# Patient Record
Sex: FEMALE | Race: OTHER
[De-identification: names, ages, dates, MRNs, and addresses within clinical notes are randomized per-mention and may not be internally consistent; named-entity substitution may affect disease eponyms.]

---

## 2017-04-21 ENCOUNTER — HOSPITAL ENCOUNTER (EMERGENCY)
Dept: HOSPITAL 56 - MW.ED | Age: 47
Discharge: HOME | End: 2017-04-21
Payer: MEDICAID

## 2017-04-21 VITALS — DIASTOLIC BLOOD PRESSURE: 80 MMHG | SYSTOLIC BLOOD PRESSURE: 150 MMHG

## 2017-04-21 DIAGNOSIS — L40.8: Primary | ICD-10-CM

## 2017-04-21 DIAGNOSIS — M79.1: ICD-10-CM

## 2017-04-21 LAB
CHLORIDE SERPL-SCNC: 111 MMOL/L (ref 98–110)
SODIUM SERPL-SCNC: 143 MMOL/L (ref 136–146)

## 2017-04-21 PROCEDURE — 82550 ASSAY OF CK (CPK): CPT

## 2017-04-21 PROCEDURE — 96374 THER/PROPH/DIAG INJ IV PUSH: CPT

## 2017-04-21 PROCEDURE — 85025 COMPLETE CBC W/AUTO DIFF WBC: CPT

## 2017-04-21 PROCEDURE — 36415 COLL VENOUS BLD VENIPUNCTURE: CPT

## 2017-04-21 PROCEDURE — 80053 COMPREHEN METABOLIC PANEL: CPT

## 2017-04-21 PROCEDURE — 84703 CHORIONIC GONADOTROPIN ASSAY: CPT

## 2017-04-21 PROCEDURE — 84484 ASSAY OF TROPONIN QUANT: CPT

## 2017-04-21 PROCEDURE — 93005 ELECTROCARDIOGRAM TRACING: CPT

## 2017-04-21 PROCEDURE — 71020: CPT

## 2017-04-21 PROCEDURE — 96361 HYDRATE IV INFUSION ADD-ON: CPT

## 2017-04-21 PROCEDURE — 99284 EMERGENCY DEPT VISIT MOD MDM: CPT

## 2017-04-21 PROCEDURE — 81001 URINALYSIS AUTO W/SCOPE: CPT

## 2017-04-21 NOTE — EDM.PDOC
ED HPI GENERAL MEDICAL PROBLEM





- General


Chief Complaint: Back Pain or Injury


Stated Complaint: BACK PAIN


Time Seen by Provider: 17 19:27





- History of Present Illness


INITIAL COMMENTS - FREE TEXT/NARRATIVE: 





HISTORY AND PHYSICAL:





History of present illness:


Patient is 47-year-old  female with history of psoriasis who presents 

with a concern of 5-7 days or body aches denies fever chills nausea vomiting or 

other concern no trauma. Patient describes what may be manjeet-menopausal.





Review of systems: 


As per history of present illness and below otherwise all systems reviewed and 

negative.





Past medical history: 


As per history of present illness and as reviewed below otherwise 

noncontributory.





Surgical history: 


As per history of present illness and as reviewed below otherwise 

noncontributory.





Social history: 


No reported history of drug or alcohol abuse.





Family history: 


As per history of present illness and as reviewed below otherwise 

noncontributory.





Physical exam:


HEENT: Atraumatic, normocephalic, pupils reactive, negative for conjunctival 

pallor or scleral icterus, mucous membranes moist, throat clear, neck supple, 

nontender, trachea midline.


Lungs: Clear to auscultation, breath sounds equal bilaterally, chest nontender.


Heart: S1S2, regular, negative for clicks, rubs, or JVD.


Abdomen: Soft, nondistended, nontender. Negative for masses or 

hepatosplenomegaly. Negative for costovertebral tenderness.


Pelvis: Stable nontender.


Genitourinary: Deferred.


Rectal: Deferred.


Extremities: Atraumatic, negative for cords or calf pain. Neurovascular 

unremarkable. Psoriatic rash noted posterior forearm bilaterally


Neuro: Awake, alert, oriented. Cranial nerves II through XII unremarkable. 

Cerebellum unremarkable. Motor and sensory unremarkable throughout. Exam 

nonfocal.





Diagnostics:


CBC CMP CPK UA hCG





Therapeutics:


Normal saline 1 L bolus Toradol 30 mg IV





Impression: 


#1 history of psoriasis #2 myalgia #3 medical screening exam





Definitive disposition and diagnosis as appropriate pending reevaluation and 

review of above.


Treatments PTA: Reports: NSAIDS


  ** lower back


Pain Score (Numeric/FACES): 9





- Related Data


 Allergies











Allergy/AdvReac Type Severity Reaction Status Date / Time


 


No Known Allergies Allergy   Verified 17 19:27











Home Meds: 


 Home Meds





Omeprazole 40 mg PO DAILY 10/17/16 [History]











Past Medical History


HEENT History: 


Other HEENT History: otits media, perforated tympanic membrane, TMJ


Cardiovascular History: Reports: None


Respiratory History: Reports: None


Gastrointestinal History: Reports: GERD


Genitourinary History: Reports: None


OB/GYN History: Reports: Pregnancy


Musculoskeletal History: Reports: None


Neurological History: Reports: None


Other Neuro History: head injury


Psychiatric History: Reports: None


Endocrine/Metabolic History: Reports: None


Hematologic History: Reports: None


Immunologic History: Reports: None


Oncologic (Cancer) History: Reports: None


Dermatologic History: Reports: Psoriasis





- Infectious Disease History


Infectious Disease History: Reports: None





- Past Surgical History


Head Surgeries/Procedures: Reports: None


Cardiovascular Surgical History: Reports: None


Female  Surgical History: Reports:  section





Social & Family History





- Family History


Family Medical History: Noncontributory





- Tobacco Use


Smoking Status *Q: Current Some Day Smoker


Years of Tobacco use: 36


Packs/Tins Daily: 2





- Caffeine Use


Caffeine Use: Reports: Coffee





- Recreational Drug Use


Recreational Drug Use: No





ED ROS GENERAL





- Review of Systems


Review Of Systems: ROS reveals no pertinent complaints other than HPI.





ED EXAM, GENERAL





- Physical Exam


Exam: See Below (See dictation)





Course





- Vital Signs


Last Recorded V/S: 





 Last Vital Signs











Temp  36.3 C   17 19:28


 


Pulse  70   17 19:28


 


Resp  16   17 19:28


 


BP  143/88 H  17 19:28


 


Pulse Ox  99   17 19:28














- Orders/Labs/Meds


Orders: 





 Active Orders 24 hr











 Category Date Time Status


 


 EKG Documentation Completion [RC] STAT Care  17 19:37 Active


 


 Chest 2V [CR] Stat Exams  17 19:38 Ordered


 


 Sodium Chloride 0.9% [Normal Saline] 1,000 ml Med  17 19:39 Active





 IV STAT   








 Medication Orders





Sodium Chloride (Normal Saline)  1,000 mls @ 999 mls/hr IV STAT ONE


   Stop: 17 20:39


   Last Admin: 17 19:57  Dose: 999 mls/hr








Labs: 





 Laboratory Tests











  17 Range/Units





  19:45 19:55 19:55 


 


WBC   7.36   (4.0-11.0)  K/uL


 


RBC   4.41   (4.30-5.90)  M/uL


 


Hgb   12.9   (12.0-16.0)  g/dL


 


Hct   38.0   (36.0-46.0)  %


 


MCV   86.2   (80.0-98.0)  fL


 


MCH   29.3   (27.0-32.0)  pg


 


MCHC   33.9   (31.0-37.0)  g/dL


 


RDW Std Deviation   43.7   (28.0-62.0)  fl


 


RDW Coeff of Marleny   14   (11.0-15.0)  %


 


Plt Count   242   (150-400)  K/uL


 


MPV   11.50   (7.40-12.00)  fL


 


Neut % (Auto)   59.5   (48.0-80.0)  %


 


Lymph % (Auto)   31.7   (16.0-40.0)  %


 


Mono % (Auto)   6.5   (0.0-15.0)  %


 


Eos % (Auto)   2.0   (0.0-7.0)  %


 


Baso % (Auto)   0.3   (0.0-1.5)  %


 


Neut # (Auto)   4.4   (1.4-5.7)  K/uL


 


Lymph # (Auto)   2.3   (0.6-2.4)  K/uL


 


Mono # (Auto)   0.5   (0.0-0.8)  K/uL


 


Eos # (Auto)   0.2   (0.0-0.7)  K/uL


 


Baso # (Auto)   0.0   (0.0-0.1)  K/uL


 


Nucleated RBC %   0.0   /100WBC


 


Nucleated RBCs #   0   K/uL


 


Sodium    143  (136-146)  mmol/L


 


Potassium    4.1  (3.5-5.1)  mmol/L


 


Chloride    111 H  ()  mmol/L


 


Carbon Dioxide    22  (21-31)  mmol/L


 


BUN    15  (6.0-23.0)  mg/dL


 


Creatinine    0.8  (0.6-1.5)  mg/dL


 


Est Cr Clr Drug Dosing    68.76  mL/min


 


Estimated GFR (MDRD)    > 60.0  ml/min


 


Glucose    102  ()  mg/dL


 


Calcium    8.8  (8.8-10.8)  mg/dL


 


Total Bilirubin    0.3  (0.1-1.5)  mg/dL


 


AST    18  (5-40)  IU/L


 


ALT    19  (8-54)  IU/L


 


Alkaline Phosphatase    74  ()  


 


Creatine Kinase    90  (9-236)  IU/L


 


Troponin I     (0.0-0.29)  NG/ML


 


Total Protein    7.0  (6.0-8.0)  g/dL


 


Albumin    3.9  (3.5-5.0)  g/dL


 


Globulin    3.1  (2.0-3.5)  g/dL


 


Albumin/Globulin Ratio    1.3  (1.3-2.8)  


 


HCG, Qual     (NEG)  


 


Urine Color  YELLOW    


 


Urine Appearance  CLEAR    


 


Urine pH  6.0    (5.0-8.0)  


 


Ur Specific Gravity  1.020    (1.001-1.035)  


 


Urine Protein  NEGATIVE    (NEGATIVE)  mg/dL


 


Urine Glucose (UA)  NEGATIVE    (NEGATIVE)  mg/dL


 


Urine Ketones  NEGATIVE    (NEGATIVE)  mg/dL


 


Urine Occult Blood  NEGATIVE    (NEGATIVE)  


 


Urine Nitrite  NEGATIVE    (NEGATIVE)  


 


Urine Bilirubin  NEGATIVE    (NEGATIVE)  


 


Urine Urobilinogen  0.2    (<2.0)  EU/dL


 


Ur Leukocyte Esterase  NEGATIVE    (NEGATIVE)  


 


Urine RBC  0-2    (0-2/HPF)  


 


Urine WBC  0-2    (0-5/HPF)  


 


Ur Epithelial Cells  FEW    (NONE-FEW)  


 


Urine Bacteria  FEW    (NEGATIVE)  


 


Urine Mucus  LIGHT    (NONE-MOD)  














  17 Range/Units





  19:55 19:55 


 


WBC    (4.0-11.0)  K/uL


 


RBC    (4.30-5.90)  M/uL


 


Hgb    (12.0-16.0)  g/dL


 


Hct    (36.0-46.0)  %


 


MCV    (80.0-98.0)  fL


 


MCH    (27.0-32.0)  pg


 


MCHC    (31.0-37.0)  g/dL


 


RDW Std Deviation    (28.0-62.0)  fl


 


RDW Coeff of Marleny    (11.0-15.0)  %


 


Plt Count    (150-400)  K/uL


 


MPV    (7.40-12.00)  fL


 


Neut % (Auto)    (48.0-80.0)  %


 


Lymph % (Auto)    (16.0-40.0)  %


 


Mono % (Auto)    (0.0-15.0)  %


 


Eos % (Auto)    (0.0-7.0)  %


 


Baso % (Auto)    (0.0-1.5)  %


 


Neut # (Auto)    (1.4-5.7)  K/uL


 


Lymph # (Auto)    (0.6-2.4)  K/uL


 


Mono # (Auto)    (0.0-0.8)  K/uL


 


Eos # (Auto)    (0.0-0.7)  K/uL


 


Baso # (Auto)    (0.0-0.1)  K/uL


 


Nucleated RBC %    /100WBC


 


Nucleated RBCs #    K/uL


 


Sodium    (136-146)  mmol/L


 


Potassium    (3.5-5.1)  mmol/L


 


Chloride    ()  mmol/L


 


Carbon Dioxide    (21-31)  mmol/L


 


BUN    (6.0-23.0)  mg/dL


 


Creatinine    (0.6-1.5)  mg/dL


 


Est Cr Clr Drug Dosing    mL/min


 


Estimated GFR (MDRD)    ml/min


 


Glucose    ()  mg/dL


 


Calcium    (8.8-10.8)  mg/dL


 


Total Bilirubin    (0.1-1.5)  mg/dL


 


AST    (5-40)  IU/L


 


ALT    (8-54)  IU/L


 


Alkaline Phosphatase    ()  


 


Creatine Kinase    (9-236)  IU/L


 


Troponin I  < 0.10   (0.0-0.29)  NG/ML


 


Total Protein    (6.0-8.0)  g/dL


 


Albumin    (3.5-5.0)  g/dL


 


Globulin    (2.0-3.5)  g/dL


 


Albumin/Globulin Ratio    (1.3-2.8)  


 


HCG, Qual   NEGATIVE  (NEG)  


 


Urine Color    


 


Urine Appearance    


 


Urine pH    (5.0-8.0)  


 


Ur Specific Gravity    (1.001-1.035)  


 


Urine Protein    (NEGATIVE)  mg/dL


 


Urine Glucose (UA)    (NEGATIVE)  mg/dL


 


Urine Ketones    (NEGATIVE)  mg/dL


 


Urine Occult Blood    (NEGATIVE)  


 


Urine Nitrite    (NEGATIVE)  


 


Urine Bilirubin    (NEGATIVE)  


 


Urine Urobilinogen    (<2.0)  EU/dL


 


Ur Leukocyte Esterase    (NEGATIVE)  


 


Urine RBC    (0-2/HPF)  


 


Urine WBC    (0-5/HPF)  


 


Ur Epithelial Cells    (NONE-FEW)  


 


Urine Bacteria    (NEGATIVE)  


 


Urine Mucus    (NONE-MOD)  











Meds: 





Medications











Generic Name Dose Route Start Last Admin





  Trade Name Freq  PRN Reason Stop Dose Admin


 


Sodium Chloride  1,000 mls @ 999 mls/hr  17 19:39  17 19:57





  Normal Saline  IV  17 20:39  999 mls/hr





  STAT ONE   Administration














Discontinued Medications














Generic Name Dose Route Start Last Admin





  Trade Name Pritesh  PRN Reason Stop Dose Admin


 


Ketorolac Tromethamine  30 mg  17 19:59  17 20:03





  Toradol  IVPUSH  17 20:00  30 mg





  ONETIME ONE   Administration














Departure





- Departure


Time of Disposition: 20:40


Disposition: Home, Self-Care 01


Condition: good


Clinical Impression: 


 Psoriasis, Myalgia, Encounter for medical screening examination





Forms:  ED Department Discharge


Additional Instructions: 


The following information is given to patients seen in the emergency department 

who are being discharged to home. This information is to outline your options 

for follow-up care. We provide all patients seen in our emergency department 

with a follow-up referral.





The need for follow-up, as well as the timing and circumstances, are variable 

depending upon the specifics of your emergency department visit.





If you don't have a primary care physician on staff, we will provide you with a 

referral. We always advise you to contact your personal physician following an 

emergency department visit to inform them of the circumstance of the visit and 

for follow-up with them and/or the need for any referrals to a consulting 

specialist.





The emergency department will also refer you to a specialist when appropriate. 

This referral assures that you have the opportunity for followup care with a 

specialist. All of these measure are taken in an effort to provide you with 

optimal care, which includes your followup.





Under all circumstances we always encourage you to contact your private 

physician who remains a resource for coordinating  your care. When calling for 

followup care, please make the office aware that this follow-up is from your 

recent emergency room visit. If for any reason you are refused follow-up, 

please contact the [facility] emergency department at [phone] 

and asked to speak to the emergency department charge nurse.























[facility]


Primary Care


[address] [place] [id number]


Phone: [phone]


Fax: [phone]














Motrin/Tylenol as directed call to schedule appointment with clinic above is 

discussed return as needed as discussed





- My Orders


Last 24 Hours: 





My Active Orders





17 19:37


EKG Documentation Completion [RC] STAT 





17 19:38


Chest 2V [CR] Stat 





17 19:39


Sodium Chloride 0.9% [Normal Saline] 1,000 ml IV STAT 














- Assessment/Plan


Last 24 Hours: 





My Active Orders





17 19:37


EKG Documentation Completion [RC] STAT 





17 19:38


Chest 2V [CR] Stat 





17 19:39


Sodium Chloride 0.9% [Normal Saline] 1,000 ml IV STAT

## 2017-04-24 ENCOUNTER — HOSPITAL ENCOUNTER (OUTPATIENT)
Dept: HOSPITAL 56 - MW.CHIM | Age: 47
End: 2017-04-24
Attending: INTERNAL MEDICINE
Payer: MEDICAID

## 2017-04-24 DIAGNOSIS — M50.322: ICD-10-CM

## 2017-04-24 DIAGNOSIS — M47.896: ICD-10-CM

## 2017-04-24 DIAGNOSIS — M54.9: Primary | ICD-10-CM

## 2017-04-24 DIAGNOSIS — M50.323: ICD-10-CM

## 2017-04-24 NOTE — CR
EXAM DATE: 17



PATIENT'S AGE: 47





Patient: MIRIAM FUNK



Facility: [place], [place]

Patient ID: [id number]

Site Patient ID: [id number].

Site Accession #: [id number]

: 1970

Study: XRay Chest [id number]/21/2017 8:48:23 PM

Ordering Physician: Sebastian Deras



Final Report: 

INDICATION: cough, sob, LBP



COMPARISON: 



2 View Chest.



Findings: The lungs are clear. Pulmonary vascularity, mediastinum and cardiac 
silhouette are within normal limits. No effusions and no pneumothorax. Osseous 
structures appear unremarkable.



Impression: No evidence of acute cardiopulmonary disease.



Dictated by: Scooter Melendez MD @ 2017 21:01:42

(Electronic Signature)



Report Signed by Proxy and Original Signed Document filed in the

Medical Record.
MTDD

## 2017-04-26 NOTE — CR
EXAM DATE: 17



PATIENT'S AGE: 47





Patient: MIRIAM COOK



Facility: [place], [place]

Patient ID: [id number]

Site Patient ID: [id number].

Site Accession #: [id number].

: 1970

Study: XRay Spine Cervical [id number]/24/2017 4:06:27 PM

Ordering Physician: Leah Blas



Final Report: 

HISTORY:

Dorsalgia.



Findings:

Open-mouth, AP and lateral radiographs of cervical spine demonstrate the base 
of the dens is intact. The lateral masses are situated normally on C2. The 
atlanto dens interval is normal. The prevertebral soft tissues are normal. 
There is loss of lordosis present. There is decreased disc space, spurring and 
calcification of the anulus at C5-6 and C6-7. The subluxation. C7-T1 alignment 
is not well seen. 



Impression:

1. Loss of lordosis. This may be a result all muscle spasm versus positioning.

2. Degenerative changes of the discs at C5-6 and C6-7.



Dictated by Marianela Mays MD @ 2017 12:28AM

(Electronic Signature)



Report Signed by Proxy and Original Signed Document filed in the

Medical Record.
[facility]

## 2017-04-26 NOTE — CR
EXAM DATE: 17



PATIENT'S AGE: 47





Patient: MIRIAM COOK



Facility: [place], [place]

Patient ID: [id number]

Site Patient ID: [id number].

Site Accession #: [id number].

: 1970

Study: XRay Spine Lumbar [id number]/24/2017 4:05:58 PM

Ordering Physician: Leah Blas



Final Report: 

HISTORY:

Dorsalgia.



Findings:

AP, lateral and coned-down lateral views lumbar spine demonstrate maintenance 
of vertebral body height. There is mild peridiscal spurring seen throughout the 
lumbar spine. No spondylolisthesis seen. Coned views suggest L5 pars defects.



Impression:

1. Mild degenerative changes throughout the lumbar spine. 

2. No acute compression fracture. 

3. Appearance of L5 pars defects without spondylolisthesis.



Dictated by Marianela Mays MD @ 2017 12:58AM

(Electronic Signature)



Report Signed by Proxy and Original Signed Document filed in the

Medical Record.
MTDD

## 2017-09-29 ENCOUNTER — HOSPITAL ENCOUNTER (OUTPATIENT)
Dept: HOSPITAL 56 - MW.SDS | Age: 47
Discharge: HOME | End: 2017-09-29
Attending: SURGERY
Payer: MEDICAID

## 2017-09-29 VITALS — SYSTOLIC BLOOD PRESSURE: 112 MMHG | DIASTOLIC BLOOD PRESSURE: 69 MMHG

## 2017-09-29 DIAGNOSIS — F17.210: ICD-10-CM

## 2017-09-29 DIAGNOSIS — K29.50: Primary | ICD-10-CM

## 2017-09-29 DIAGNOSIS — Z79.899: ICD-10-CM

## 2017-09-29 DIAGNOSIS — K21.0: ICD-10-CM

## 2017-09-29 DIAGNOSIS — Z98.890: ICD-10-CM

## 2017-09-29 PROCEDURE — 43239 EGD BIOPSY SINGLE/MULTIPLE: CPT

## 2017-09-29 PROCEDURE — 81025 URINE PREGNANCY TEST: CPT

## 2017-09-29 NOTE — PCM.OPNOTE
- General Post-Op/Procedure Note


Date of Surgery/Procedure: 09/29/17


Operative Procedure(s): Esophagogastroduodenoscopy with biopsy


Pre Op Diagnosis: Epigastric and right upper quadrant pain


Post-Op Diagnosis: Acute gastritis and esophagitis


Anesthesia Technique: MAC (ASA II)


Primary Surgeon: Miguel Grant


Assistant: Amy Lemon


Condition: Good


Free Text/Narrative:: 





Dictation [id number]





CPT CODE 54057

## 2017-09-29 NOTE — OR
SURGEON:

Miguel Grant M.D.

 

DATE OF PROCEDURE:  09/29/2017

 

OPERATION PERFORMED:

Esophagogastroduodenoscopy with biopsy, surgeon Dr. Grant.

 

ANESTHESIA:

MAC.

 

ASA CLASSIFICATION:

II.

 

PREOPERATIVE DIAGNOSES:

Right upper quadrant pain with normal biliary function.

 

POSTOPERATIVE DIAGNOSES:

Gastritis and esophagitis.

 

DESCRIPTION OF PROCEDURE:

The patient was taken to the endoscopy room, positioned on the endoscopy table

in the supine position.  Time-out was called for appropriate identification of

the patient and procedure.  A bite block was placed between the patient's teeth.

Monitored anesthesia care was provided.  The gastroscope was inserted through

the bite block into the mouth and advanced without difficulty through the

oropharynx and esophagus into the stomach and subsequently into the duodenum.

Examination was carried out in a retrograde fashion.  Duodenum shows no acute

inflammatory changes or ulcerations.  The stomach does show mild-to-moderate

gastritis.  Antral biopsies were obtained to look for the presence of

Helicobacter pylori.  The gastroscope was retroflexed to visualize the proximal

stomach.  No hiatal hernia was noted.  No ulcerations were noted in the proximal

stomach, but again, gastritis picture is noted.  The GE junction was well

defined.  There were some mild inflammatory changes in the distal esophagus and

separate esophageal biopsies were obtained.  The esophagus demonstrated good

contractility.  No mid or proximal lesions were identified.  The vocal cords

were visualized as the scope was withdrawn and noted to move symmetrically.  The

gastroscope was then removed with the patient having tolerated the procedure

well.

 

She was taken to recovery room in stable condition.

 

 

NICOL / [place]

DD:  09/29/2017 11:58:50

DT:  09/29/2017 19:18:35

Job #:  [id number]

## 2017-09-29 NOTE — PCM.PREANE
Preanesthetic Assessment





- Anesthesia/Transfusion/Family Hx


Anesthesia History: Prior Anesthesia Without Reaction


Family History of Anesthesia Reaction: No


Transfusion History: No Prior Transfusion(s)


Intubation History: Unknown





- Review of Systems


General: No Symptoms


Pulmonary: No Symptoms


Cardiovascular: No Symptoms


Gastrointestinal: Abdominal Pain


Neurological: No Symptoms


Other: Reports: None





- Physical Assessment


Height: 1.6 m


Weight: 81.193 kg


Airway Class: Mallampati = 2


Dentition: Reports: Normal Dentition


Thyro-Mental Finger Breadths: 3


Mouth Opening Finger Breadths: 3


ROM/Head Extension: Full


Lungs: Clear to Auscultation, Normal Respiratory Effort


Cardiovascular: Regular Rate, Regular Rhythm





- Lab


Values: 





 Laboratory Last Values











Urine HCG, Qual  NEGATIVE  (NEGATIVE)   17  10:31    














- Allergies


Allergies/Adverse Reactions: 


 Allergies











Allergy/AdvReac Type Severity Reaction Status Date / Time


 


No Known Allergies Allergy   Verified 17 19:27














- Blood


Blood Available: No





- Anesthesia Plan


Pre-Op Medication Ordered: None





- Acknowledgements


Anesthesia Type Planned: MAC


Pt an Appropriate Candidate for the Planned Anesthesia: Yes


Alternatives and Risks of Anesthesia Discussed w Pt/Guardian: Yes


Pt/Guardian Understands and Agrees with Anesthesia Plan: Yes





PreAnesthesia Questionnaire


HEENT History: Reports: Other (See Below)


Other HEENT History: tympanoplasty 2016, h/o TMJ problems


Cardiovascular History: Reports: None


Respiratory History: Reports: None


Gastrointestinal History: Reports: GERD


Genitourinary History: Reports: None


OB/GYN History: Reports: Pregnancy


Musculoskeletal History: Reports: None, Other (See Below) (occasiona back pain 

when stressed)


Neurological History: Reports: None


Psychiatric History: Reports: None


Endocrine/Metabolic History: Reports: Obesity/BMI 30+


Hematologic History: Reports: None


Immunologic History: Reports: None


Oncologic (Cancer) History: Reports: None


Dermatologic History: Reports: Psoriasis





- Infectious Disease History


Infectious Disease History: Reports: None





- Past Surgical History


Head Surgeries/Procedures: Reports: None


HEENT Surgical History: Reports: Other (See Below) (left tympanoplasty '16)


Cardiovascular Surgical History: Reports: None


Female  Surgical History: Reports:  Section (x3), Tubal Ligation





- SUBSTANCE USE


Smoking Status *Q: Light Tobacco Smoker (1/ ppd'\)


Tobacco Use Within Last Twelve Months: Cigarettes


Recreational Drug Use History: No





- HOME MEDS


Home Medications: 


 Home Meds





Omeprazole 40 mg PO DAILY 10/17/16 [History]


Ibuprofen 2 tab PO ASDIRECTED PRN 17 [History]











- CURRENT (IN HOUSE) MEDS


Current Meds: 





 Current Medications





Lactated Ringer's (Ringers, Lactated)  1,000 mls @ 125 mls/hr IV ASDIRECTED PAMELA


   Last Admin: 17 11:00 Dose:  125 mls/hr





Discontinued Medications





Lidocaine (Xylocaine-Mpf 2%) Confirm Administered Dose 10 ml .ROUTE .STK-MED ONE


   Stop: 17 11:26


Midazolam HCl (Versed 1 Mg/Ml) Confirm Administered Dose 2 mg .ROUTE .STK-MED 

ONE


   Stop: 17 11:26


Propofol (Diprivan  20 Ml) Confirm Administered Dose 400 mg .ROUTE .STK-MED ONE


   Stop: 17 11:26

## 2017-11-08 ENCOUNTER — HOSPITAL ENCOUNTER (EMERGENCY)
Dept: HOSPITAL 56 - MW.ED | Age: 47
Discharge: HOME | End: 2017-11-08
Payer: MEDICAID

## 2017-11-08 VITALS — SYSTOLIC BLOOD PRESSURE: 115 MMHG | DIASTOLIC BLOOD PRESSURE: 54 MMHG

## 2017-11-08 DIAGNOSIS — Z79.899: ICD-10-CM

## 2017-11-08 DIAGNOSIS — K21.9: ICD-10-CM

## 2017-11-08 DIAGNOSIS — M54.40: Primary | ICD-10-CM

## 2017-11-08 DIAGNOSIS — F17.210: ICD-10-CM

## 2017-11-08 LAB
CHLORIDE SERPL-SCNC: 110 MMOL/L (ref 98–110)
SODIUM SERPL-SCNC: 140 MMOL/L (ref 136–146)

## 2017-11-08 PROCEDURE — 99283 EMERGENCY DEPT VISIT LOW MDM: CPT

## 2017-11-08 PROCEDURE — 81001 URINALYSIS AUTO W/SCOPE: CPT

## 2017-11-08 PROCEDURE — 96372 THER/PROPH/DIAG INJ SC/IM: CPT

## 2017-11-08 PROCEDURE — 80053 COMPREHEN METABOLIC PANEL: CPT

## 2017-11-08 PROCEDURE — 36415 COLL VENOUS BLD VENIPUNCTURE: CPT

## 2017-11-08 PROCEDURE — 85025 COMPLETE CBC W/AUTO DIFF WBC: CPT

## 2017-11-08 NOTE — EDM.PDOC
ED HPI GENERAL MEDICAL PROBLEM





- General


Chief Complaint: Back Pain or Injury


Stated Complaint: BACK PAIN


Time Seen by Provider: 17 11:13


Source of Information: Reports: Patient


History Limitations: Reports: No Limitations





- History of Present Illness


INITIAL COMMENTS - FREE TEXT/NARRATIVE: 


HISTORY AND PHYSICAL:





History of present illness:


Patient is a 47-year-old female who presents to the emergency room with 

complaints of low back pain 5 days. She states that she is a  and 

is frequently twisting and turning and bending at the hips/waist. She noticed 

at work that her low back started to become uncomfortable and would "come in 

waves" radiating down both buttocks. She denies any dysuria but does state that 

the pain does radiate from her low back into her groin bilaterally. Bowel 

movements have been normal, with last BM being yesterday morning.


She denies any recent falls, trauma or injury. She is not incontinent of stool 

or urine





Review of systems: 


As per history of present illness and below otherwise all systems reviewed and 

negative.





Past medical history: 


As per history of present illness and as reviewed below otherwise 

noncontributory.





Surgical history: 


As per history of present illness and as reviewed below otherwise 

noncontributory.





Social history: 


No reported history of drug or alcohol abuse.





Family history: 


As per history of present illness and as reviewed below otherwise 

noncontributory.





Physical exam:


Gen.: Well-developed and well-nourished 47-year-old  female. Appears 

nontoxic.


HEENT: Atraumatic, normocephalic, pupils reactive, negative for conjunctival 

pallor or scleral icterus, mucous membranes moist, throat clear, neck supple, 

nontender, trachea midline.


Lungs: Clear to auscultation, breath sounds equal bilaterally, chest nontender.


Heart: S1S2, regular rate and rhythm


Abdomen: Soft, nondistended, nontender. Negative for masses or 

hepatosplenomegaly. Negative for costovertebral tenderness.


Pelvis: Stable nontender.


Genitourinary: Deferred.


Rectal: Deferred.


Back: No vertebral or pinpoint tenderness. Able to walk on tiptoes and heels. 

Able to flex and extend at the waist and twist side-to-side without any problem.


Extremities: Atraumatic, moves all extremities per self, negative for cords or 

calf pain. Neurovascular unremarkable. CMS intact.


Neuro: Awake, alert, oriented. Cranial nerves II through XII unremarkable. 

Cerebellum unremarkable. Motor and sensory unremarkable throughout. Exam 

nonfocal.





Patient has much improved since receiving her IM injections. Labs were reviewed 

with the patient. I did instruct her that she may need to follow-up with her 

primary care provider for further evaluation if she continues to have back 

pain. We discussed proper body mechanics while at work. She may want to see if 

her employer will give her a back brace with the movement and lifting she is 

doing. Patient is agreeable to plan of care denies any further questions at 

this time.





Diagnostics:


CBC, CMP, UA





Therapeutics:


Toradol and Norflex





Impression: 


Sciatic back pain





Plan:


1. Please take the Flexeril (#10) as prescribed. Do not take this medication 

while driving or needing to be functioning at work as it may cause drowsiness. 

Take the Naproxen as directed. Do not take any additional NSAIDs with this 

medication such as Aleve or ibuprofen. You may take Tylenol if you need for 

breakthrough pain.


2. May apply gentle heat to the low back and gentle stretching for comfort.


3. Follow-up with your primary care provider in the next 1-2 days. Return to 

the ED as needed and as discussed.





Definitive disposition and diagnosis as appropriate pending reevaluation and 

review of above.





Duration: Day(s): (5), Intermittent


Location: Reports: Back


  ** low back


Pain Score (Numeric/FACES): 8





- Related Data


 Allergies











Allergy/AdvReac Type Severity Reaction Status Date / Time


 


No Known Allergies Allergy   Verified 17 11:07











Home Meds: 


 Home Meds





Omeprazole 40 mg PO DAILY 10/17/16 [History]


Ibuprofen 2 tab PO ASDIRECTED PRN 17 [History]











Past Medical History





- Past Health History


Medical/Surgical History: Denies Medical/Surgical History


HEENT History: Reports: Other (See Below)


Other HEENT History: tympanoplasty 2016, h/o TMJ problems


Cardiovascular History: Reports: None


Respiratory History: Reports: None


Gastrointestinal History: Reports: GERD


Genitourinary History: Reports: None


OB/GYN History: Reports: Pregnancy


Musculoskeletal History: Reports: None, Other (See Below)


Neurological History: Reports: None


Psychiatric History: Reports: None


Endocrine/Metabolic History: Reports: Obesity/BMI 30+


Hematologic History: Reports: None


Immunologic History: Reports: None


Oncologic (Cancer) History: Reports: None


Dermatologic History: Reports: Psoriasis





- Infectious Disease History


Infectious Disease History: Reports: None





- Past Surgical History


Head Surgeries/Procedures: Reports: None


HEENT Surgical History: Reports: Other (See Below)


Cardiovascular Surgical History: Reports: None


Female  Surgical History: Reports:  Section, Tubal Ligation





Social & Family History





- Family History


Family Medical History: Noncontributory





- Tobacco Use


Smoking Status *Q: Current Every Day Smoker


Years of Tobacco use: 32


Packs/Tins Daily: 1





- Caffeine Use


Caffeine Use: Reports: Coffee





- Recreational Drug Use


Recreational Drug Use: No





ED ROS GENERAL





- Review of Systems


Review Of Systems: ROS reveals no pertinent complaints other than HPI.





ED EXAM,LOWER BACK PAIN/INJURY





- Physical Exam


Exam: See Below (See dictation)





Course





- Vital Signs


Last Recorded V/S: 


 Last Vital Signs











Temp  36.6 C   17 10:43


 


Pulse  73   17 10:43


 


Resp  18   17 10:43


 


BP  115/54 L  17 10:43


 


Pulse Ox  97   17 10:43














- Orders/Labs/Meds


Orders: 


 Active Orders 24 hr











 Category Date Time Status


 


 UA W/MICROSCOPIC [URIN] Stat Lab  17 11:27 Uncollected


 


 Orphenadrine [Norflex] Med  17 11:45 Active





 60 mg IM Q12H   








 Medication Orders





Orphenadrine Citrate (Norflex)  60 mg IM Q12H PAMELA


   Last Admin: 17 11:50  Dose: 60 mg








Labs: 


 Laboratory Tests











  17 Range/Units





  11:35 11:35 


 


WBC  6.16   (4.0-11.0)  K/uL


 


RBC  4.54   (4.30-5.90)  M/uL


 


Hgb  13.9   (12.0-16.0)  g/dL


 


Hct  40.4   (36.0-46.0)  %


 


MCV  89.0   (80.0-98.0)  fL


 


MCH  30.6   (27.0-32.0)  pg


 


MCHC  34.4   (31.0-37.0)  g/dL


 


RDW Std Deviation  44.6   (28.0-62.0)  fl


 


RDW Coeff of Marleny  14   (11.0-15.0)  %


 


Plt Count  225   (150-400)  K/uL


 


MPV  11.80   (7.40-12.00)  fL


 


Neut % (Auto)  59.7   (48.0-80.0)  %


 


Lymph % (Auto)  31.5   (16.0-40.0)  %


 


Mono % (Auto)  6.0   (0.0-15.0)  %


 


Eos % (Auto)  2.3   (0.0-7.0)  %


 


Baso % (Auto)  0.5   (0.0-1.5)  %


 


Neut # (Auto)  3.7   (1.4-5.7)  K/uL


 


Lymph # (Auto)  1.9   (0.6-2.4)  K/uL


 


Mono # (Auto)  0.4   (0.0-0.8)  K/uL


 


Eos # (Auto)  0.1   (0.0-0.7)  K/uL


 


Baso # (Auto)  0.0   (0.0-0.1)  K/uL


 


Nucleated RBC %  0.0   /100WBC


 


Nucleated RBCs #  0   K/uL


 


Sodium   140  (136-146)  mmol/L


 


Potassium   4.0  (3.5-5.1)  mmol/L


 


Chloride   110  ()  mmol/L


 


Carbon Dioxide   22  (21-31)  mmol/L


 


BUN   10  (6.0-23.0)  mg/dL


 


Creatinine   0.7  (0.6-1.5)  mg/dL


 


Est Cr Clr Drug Dosing   TNP  


 


Estimated GFR (MDRD)   > 60.0  ml/min


 


Glucose   100  ()  mg/dL


 


Calcium   8.9  (8.8-10.8)  mg/dL


 


Total Bilirubin   0.5  (0.1-1.5)  mg/dL


 


AST   16  (5-40)  IU/L


 


ALT   18  (8-54)  IU/L


 


Alkaline Phosphatase   68  ()  


 


Total Protein   6.7  (6.0-8.0)  g/dL


 


Albumin   3.8  (3.5-5.0)  g/dL


 


Globulin   2.9  (2.0-3.5)  g/dL


 


Albumin/Globulin Ratio   1.3  (1.3-2.8)  











Meds: 


Medications











Generic Name Dose Route Start Last Admin





  Trade Name Freq  PRN Reason Stop Dose Admin


 


Orphenadrine Citrate  60 mg  17 11:45  17 11:50





  Norflex  IM   60 mg





  Q12H PAMELA   Administration














Discontinued Medications














Generic Name Dose Route Start Last Admin





  Trade Name Pritesh  PRN Reason Stop Dose Admin


 


Ketorolac Tromethamine  60 mg  17 11:33  17 11:49





  Toradol  IM  17 11:34  60 mg





  ONETIME ONE   Administration














Departure





- Departure


Time of Disposition: 12:20


Disposition: Home, Self-Care 01


Clinical Impression: 


 Back pain with sciatica








- Discharge Information


Referrals: 


PCP,Unknown [Primary Care Provider] - 


Forms:  ED Department Discharge


Additional Instructions: 


My general discharge


The following information is given to patients seen in the emergency department 

who are being discharged to home. This information is to outline your options 

for follow-up care. We provide all patients seen in our emergency department 

with a follow-up referral.





The need for follow-up, as well as the timing and circumstances, are variable 

depending upon the specifics of your emergency department visit.





If you don't have a primary care physician on staff, we will provide you with a 

referral. We always advise you to contact your personal physician following an 

emergency department visit to inform them of the circumstance of the visit and 

for follow-up with them and/or the need for any referrals to a consulting 

specialist.





The emergency department will also refer you to a specialist when appropriate. 

This referral assures that you have the opportunity for follow-up care with a 

specialist. All of these measure are taken in an effort to provide you with 

optimal care, which includes your follow-up.





Under all circumstances we always encourage you to contact your private 

physician who remains a resource for coordinating your care. When calling for 

follow-up care, please make the office aware that this follow-up is from your 

recent emergency room visit. If for any reason you are refused follow-up, 

please contact the [facility] Emergency 

Department at [phone] and asked to speak to the emergency department 

charge nurse.





[facility]


Primary Care


[address] [place] [id number]


Phone: [phone]


Fax: [phone]





1. Please take the Flexeril (#10) as prescribed. Do not take this medication 

while driving or needing to be functioning at work as it may cause drowsiness. 

Take the Naproxen as directed. Do not take any additional NSAIDs with this 

medication such as Aleve or ibuprofen. You may take Tylenol if you need for 

breakthrough pain.


2. May apply gentle heat to the low back and gentle stretching for comfort.


3. Follow-up with your primary care provider in the next 1-2 days. Return to 

the ED as needed and as discussed.





- My Orders


Last 24 Hours: 


My Active Orders





17 11:27


UA W/MICROSCOPIC [URIN] Stat 





17 11:45


Orphenadrine [Norflex]   60 mg IM Q12H 














- Assessment/Plan


Last 24 Hours: 


My Active Orders





17 11:27


UA W/MICROSCOPIC [URIN] Stat 





17 11:45


Orphenadrine [Norflex]   60 mg IM Q12H

## 2020-02-17 ENCOUNTER — HOSPITAL ENCOUNTER (EMERGENCY)
Dept: HOSPITAL 56 - MW.ED | Age: 50
Discharge: HOME | End: 2020-02-17
Payer: SELF-PAY

## 2020-02-17 VITALS — SYSTOLIC BLOOD PRESSURE: 121 MMHG | DIASTOLIC BLOOD PRESSURE: 77 MMHG

## 2020-02-17 VITALS — HEART RATE: 72 BPM

## 2020-02-17 DIAGNOSIS — S80.211A: Primary | ICD-10-CM
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
## 2020-02-17 DIAGNOSIS — F17.210: ICD-10-CM

## 2020-02-17 DIAGNOSIS — E66.9: ICD-10-CM

## 2020-02-17 NOTE — CR
Right knee: AP, lateral and sunrise patellar views of the right knee 

were obtained.

 

Comparison: No previous study.

 

Medial and lateral joint compartments are maintained in height.  

Minimal osteophytes are noted off the lateral tibial margin.  No joint

 effusion is seen.  Minimal osteophytes are noted off the patella.  No

 acute fracture or dislocation is seen.

 

Impression:

1.  Minimal spurring as noted above.

2.  No acute abnormality is appreciated on 3 view right knee exam.

 

Diagnostic code #2

 

This report was dictated in [place] Standard Time

## 2020-02-17 NOTE — EDM.PDOC
ED HPI GENERAL MEDICAL PROBLEM





- General


Chief Complaint: Lower Extremity Injury/Pain


Stated Complaint: FELL ON ICE AND HURT RT KNEE


Time Seen by Provider: 20 10:22


Source of Information: Reports: Patient


History Limitations: Reports: No Limitations





- History of Present Illness


INITIAL COMMENTS - FREE TEXT/NARRATIVE: 





HISTORY AND PHYSICAL:





History of present illness:


Patient is a 49-year-old female presents to the ED with complaint of right knee 

pain. Patient states she was walking on the ice this morning when she slipped. 

She states her left knee bent out and she landed directly on her right knee. 

She states she was able to walk on it afterwards. She reports pain in the 

anterior right knee. She denies head or other injuries at this time. 





Review of systems: 


As per history of present illness and below otherwise all systems reviewed and 

negative.





Past medical history: 


As per history of present illness and as reviewed below otherwise 

noncontributory.





Surgical history: 


As per history of present illness and as reviewed below otherwise 

noncontributory.





Social history: 


No reported history of drug or alcohol abuse.





Family history: 


As per history of present illness and as reviewed below otherwise 

noncontributory.





Physical exam:


General: Patient sitting comfortably in no acute distress and nontoxic appearing


HEENT: Atraumatic, normocephalic, pupils reactive, negative for conjunctival 

pallor or scleral icterus, mucous membranes moist, throat clear, neck supple, 

nontender, trachea midline. No meningeal signs. 


Lungs: Clear to auscultation, breath sounds equal bilaterally, chest nontender.


Heart: S1S2, regular, negative for clicks, rubs, or overt murmur.


Abdomen: Soft, nondistended, nontender. Negative for masses or 

hepatosplenomegaly. Negative for costovertebral tenderness. No rigidity, rebound

, guarding.


Pelvis: Stable nontender.


Genitourinary: Deferred.


Rectal: Deferred.


Extremities: Abrasion to the right anterior knee. No obvious swelling or 

deformity. Pain to palpation of anterior knee. No proximal joint pain. CMS 

intact distally. negative for cords or calf pain. Neurovascular unremarkable.


Neuro: Awake, alert, oriented. Cranial nerves II through XII unremarkable. 

Cerebellum unremarkable. Motor and sensory unremarkable throughout. Exam 

nonfocal.





Notes: 





Diagnostics:


x-ray right knee 





Therapeutics:


none 





Prescriptions:


none 





Impression: 


Right knee injury 





Plan:


1. Ice, elevate, and motrin or tylenol as needed


2. Follow up with orthopedics, please call the number provided to schedule an 

appointment


3. Return to ED as needed as discussed 








Definitive disposition and diagnosis as appropriate pending reevaluation and 

review of above.





  ** Right Knee


Pain Score (Numeric/FACES): 8





- Related Data


 Allergies











Allergy/AdvReac Type Severity Reaction Status Date / Time


 


No Known Allergies Allergy   Verified 20 10:12











Home Meds: 


 Home Meds





. [No Known Home Meds]  20 [History]











Past Medical History





- Past Health History


Medical/Surgical History: Denies Medical/Surgical History


HEENT History: Reports: Other (See Below)


Other HEENT History: tympanoplasty 2016, h/o TMJ problems


Cardiovascular History: Reports: None


Respiratory History: Reports: None


Gastrointestinal History: Reports: GERD


Genitourinary History: Reports: None


OB/GYN History: Reports: Pregnancy


Musculoskeletal History: Reports: None, Other (See Below)


Neurological History: Reports: None


Psychiatric History: Reports: None


Endocrine/Metabolic History: Reports: Obesity/BMI 30+


Hematologic History: Reports: None


Immunologic History: Reports: None


Oncologic (Cancer) History: Reports: None


Dermatologic History: Reports: Psoriasis





- Infectious Disease History


Infectious Disease History: Reports: None





- Past Surgical History


Head Surgeries/Procedures: Reports: None


HEENT Surgical History: Reports: Other (See Below)


Cardiovascular Surgical History: Reports: None


Female  Surgical History: Reports:  Section, Tubal Ligation





Social & Family History





- Family History


Family Medical History: Noncontributory





- Tobacco Use


Smoking Status *Q: Current Every Day Smoker


Years of Tobacco use: 30


Packs/Tins Daily: 1





- Caffeine Use


Caffeine Use: Reports: Coffee





- Recreational Drug Use


Recreational Drug Use: No





Review of Systems





- Review of Systems


Review Of Systems: Comprehensive ROS is negative, except as noted in HPI.





ED EXAM, GENERAL





- Physical Exam


Exam: See Below (see dictation)





Course





- Vital Signs


Last Recorded V/S: 


 Last Vital Signs











Temp  97.4 F   20 10:13


 


Pulse  66   20 10:13


 


Resp  14   20 10:13


 


BP  121/77   20 10:13


 


Pulse Ox  98   20 10:13














Departure





- Departure


Time of Disposition: 11:13


Disposition: Home, Self-Care 01


Condition: Good


Clinical Impression: 


 Right knee injury








- Discharge Information


Referrals: 


PCP,None [Primary Care Provider] - 


Forms:  ED Department Discharge


Additional Instructions: 


The following information is given to patients seen in the emergency department 

who are being discharged to home. This information is to outline your options 

for follow-up care. We provide all patients seen in our emergency department 

with a follow-up referral.





The need for follow-up, as well as the timing and circumstances, are variable 

depending upon the specifics of your emergency department visit.





If you don't have a primary care physician on staff, we will provide you with a 

referral. We always advise you to contact your personal physician following an 

emergency department visit to inform them of the circumstance of the visit and 

for follow-up with them and/or the need for any referrals to a consulting 

specialist.





The emergency department will also refer you to a specialist when appropriate. 

This referral assures that you have the opportunity for follow-up care with a 

specialist. All of these measure are taken in an effort to provide you with 

optimal care, which includes your follow-up.





Under all circumstances we always encourage you to contact your private 

physician who remains a resource for coordinating your care. When calling for 

follow-up care, please make the office aware that this follow-up is from your 

recent emergency room visit. If for any reason you are refused follow-up, 

please contact the [facility] Emergency 

Department at [phone] and asked to speak to the emergency department 

charge nurse.





[facility]


Specialty Care - Orthopedic Clinic


 Professional [address], [address]


[place] [address]


Phone: [phone]





1. Ice, elevate, and motrin or tylenol as needed


2. Follow up with orthopedics, please call the number provided to schedule an 

appointment


3. Return to ED as needed as discussed 














Sepsis Event Note





- Evaluation


Sepsis Screening Result: No Definite Risk





- Focused Exam


Vital Signs: 


 Vital Signs











  Temp Pulse Resp BP Pulse Ox


 


 20 10:13  97.4 F  66  14  121/77  98











Date Exam was Performed: 20


Time Exam was Performed: 11:08

## 2020-07-20 NOTE — CT
CT abdomen and pelvis

 

Technique: Multiple axial sections were obtained from above the dome 

of the diaphragm inferiorly through the pubic symphysis.  Intravenous 

and oral contrast was not utilized.

 

Findings: Kidneys show no abnormal calcifications.  No ureteral stone 

or ureteral dilatation is seen.

 

Visualized lung bases show nothing acute.

 

Diminished density is noted within the liver compatible with mild 

fatty infiltration.  Spleen appears normal.  Adrenal glands show no 

nodule.  Pancreas is within normal limits.  Aorta shows no aneurysm.  

No retroperitoneal adenopathy or mesenteric abnormalities are seen.  

Appendix is seen which is normal.  No pelvic mass or adenopathy is 

seen.  No free fluid or inflammatory change is appreciated.

 

Bone window settings were reviewed which shows no acute osseous 

finding.  Mild degenerative change is scattered within the spine.

 

Impression:

1.  No renal calculi, ureteral dilatation or ureteral calculus is 

seen.

2.  Fatty infiltration within the liver.

3.  No acute abnormality is appreciated on noncontrast CT study of the

 abdomen and pelvis.

 

Diagnostic code #2

 

This report was dictated in MDT

## 2020-07-20 NOTE — EDM.PDOC
ED HPI GENERAL MEDICAL PROBLEM





- General


Chief Complaint: Genitourinary Problem


Stated Complaint: BACK PAIN


Time Seen by Provider: 20 07:46





- History of Present Illness


INITIAL COMMENTS - FREE TEXT/NARRATIVE: 





History of present illness:


50-year-old female presenting with left low flank/low back pain that radiated 

into the left lower quadrant.  Started out mild and has progressed in intensity 

for the last 4 days.  No associated nausea or vomiting.  No diarrhea.  No fevers

or chills.  She does report that on the first day she noticed her urine appeared

very dark, however now it seems to have cleared and is now normal yellow-

colored.





Review of systems: 


As per history of present illness and below otherwise all systems reviewed and 

negative.





Past medical history: 


As per history of present illness and as reviewed below otherwise 

noncontributory.





Surgical history: 


As per history of present illness and as reviewed below otherwise 

noncontributory.





Social history: 


No reported history of drug or alcohol abuse.  Daily smoker





Family history: 


As per history of present illness and as reviewed below otherwise 

noncontributory.





Physical exam:


GEN: no acute distress, well appearing


HEENT: Atraumatic, normocephalic, mucous membranes moist, 


Neck: supple, nontender, trachea midline.


Lungs: No respiratory distress.


Heart: RRR


Abdomen: Soft, nondistended, left lower quadrant tenderness, mild left upper 

quadrant tenderness


Back: nontender


Extremities: Atraumatic. Neurovascularly intact.


Neuro: Awake, alert, oriented. Neuro Exam nonfocal.


Skin: warm, dry, no lesions





Diagnostics:


[]





Therapeutics:


[]





MDM: 





Impression: 


[]





Plan:


[]





Definitive disposition and diagnosis as appropriate pending reevaluation and 

review of above.








  ** Left Back


Pain Score (Numeric/FACES): 8





- Related Data


                                    Allergies











Allergy/AdvReac Type Severity Reaction Status Date / Time


 


No Known Allergies Allergy   Verified 20 10:12











Home Meds: 


                                    Home Meds





. [No Known Home Meds]  20 [History]











Past Medical History





- Past Health History


Medical/Surgical History: Denies Medical/Surgical History


HEENT History: Reports: Other (See Below)


Other HEENT History: tympanoplasty 2016, h/o TMJ problems


Cardiovascular History: Reports: None


Respiratory History: Reports: None


Gastrointestinal History: Reports: GERD


Genitourinary History: Reports: None


OB/GYN History: Reports: Pregnancy


Musculoskeletal History: Reports: None, Other (See Below)


Neurological History: Reports: None


Psychiatric History: Reports: None


Endocrine/Metabolic History: Reports: Obesity/BMI 30+


Hematologic History: Reports: None


Immunologic History: Reports: None


Oncologic (Cancer) History: Reports: None


Dermatologic History: Reports: Psoriasis





- Infectious Disease History


Infectious Disease History: Reports: None





- Past Surgical History


Head Surgeries/Procedures: Reports: None


HEENT Surgical History: Reports: Other (See Below)


Cardiovascular Surgical History: Reports: None


Female  Surgical History: Reports:  Section, Tubal Ligation





Social & Family History





- Family History


Family Medical History: Noncontributory





- Tobacco Use


Smoking Status *Q: Current Every Day Smoker


Years of Tobacco use: 30


Packs/Tins Daily: 0.5





- Caffeine Use


Caffeine Use: Reports: Coffee





- Recreational Drug Use


Recreational Drug Use: No





ED ROS GENERAL





- Review of Systems


Review Of Systems: See Below (See HPI)





ED EXAM, GI/ABD





- Physical Exam


Exam: See Below (See HPI)





Course





- Vital Signs


Text/Narrative:: 





Left lower quadrant/left low back/flank pain.


Renal colic versus diverticulitis versus DJD/DDD of lumbar spine.  Less likely 

aortic aneurysm or dissection, with no history of vascular disease.


Labs unremarkable, UA with trace occult blood, no significant blood or infection

 seen.  CT scan with no acute findings found.  Aorta with no aneurysm.  Normal 

appendix, no renal stone and no other intra-abdominal pathology.


Pain completely resolved on reassessment.


Potential musculoskeletal etiology of the pain versus recently passed stone.  

Discussed recommendation for follow-up with the patient.  


Last Recorded V/S: 


                                Last Vital Signs











Temp  97.3 F   20 07:48


 


Pulse  56 L  20 08:37


 


Resp  20   20 08:37


 


BP  116/79   20 08:37


 


Pulse Ox  97   20 08:37














- Orders/Labs/Meds


Orders: 


                               Active Orders 24 hr











 Category Date Time Status


 


 Sodium Chloride 0.9% [Saline Flush] Med  20 07:47 Active





 10 ml FLUSH ASDIRECTED PRN   


 


 Sodium Chloride 0.9% [Saline Flush] Med  20 08:07 Active





 10 ml FLUSH ASDIRECTED PRN   


 


 Sodium Chloride 0.9% [Saline Flush] Med  20 07:47 Active





 2.5 ml FLUSH ASDIRECTED PRN   


 


 Sodium Chloride 0.9% [Saline Flush] Med  20 08:07 Active





 2.5 ml FLUSH ASDIRECTED PRN   


 


 Saline Lock Insert [OM.PC] Stat Ot  20 07:47 Ordered


 


 Saline Lock Insert [OM.PC] Stat Ot  20 08:07 Ordered








                                Medication Orders





Sodium Chloride (Saline Flush)  10 ml FLUSH ASDIRECTED PRN


   PRN Reason: Keep Vein Open


Sodium Chloride (Saline Flush)  2.5 ml FLUSH ASDIRECTED PRN


   PRN Reason: Keep Vein Open


Sodium Chloride (Saline Flush)  10 ml FLUSH ASDIRECTED PRN


   PRN Reason: Keep Vein Open


Sodium Chloride (Saline Flush)  2.5 ml FLUSH ASDIRECTED PRN


   PRN Reason: Keep Vein Open








Labs: 


                                Laboratory Tests











  20 Range/Units





  07:50 08:07 08:07 


 


WBC   7.09   (4.0-11.0)  K/uL


 


RBC   4.64   (4.30-5.90)  M/uL


 


Hgb   14.1   (12.0-16.0)  g/dL


 


Hct   41.2   (36.0-46.0)  %


 


MCV   88.8   (80.0-98.0)  fL


 


MCH   30.4   (27.0-32.0)  pg


 


MCHC   34.2   (31.0-37.0)  g/dL


 


RDW Std Deviation   41.1   (28.0-62.0)  fl


 


RDW Coeff of Marleny   13   (11.0-15.0)  %


 


Plt Count   240   (150-400)  K/uL


 


MPV   11.50   (7.40-12.00)  fL


 


Neut % (Auto)   56.1   (48.0-80.0)  %


 


Lymph % (Auto)   34.7   (16.0-40.0)  %


 


Mono % (Auto)   6.5   (0.0-15.0)  %


 


Eos % (Auto)   2.4   (0.0-7.0)  %


 


Baso % (Auto)   0.3   (0.0-1.5)  %


 


Neut # (Auto)   4.0   (1.4-5.7)  K/uL


 


Lymph # (Auto)   2.5 H   (0.6-2.4)  K/uL


 


Mono # (Auto)   0.5   (0.0-0.8)  K/uL


 


Eos # (Auto)   0.2   (0.0-0.7)  K/uL


 


Baso # (Auto)   0.0   (0.0-0.1)  K/uL


 


Nucleated RBC %   0.0   /100WBC


 


Nucleated RBCs #   0   K/uL


 


Sodium    142  (136-145)  mmol/L


 


Potassium    3.8  (3.5-5.1)  mmol/L


 


Chloride    103  ()  mmol/L


 


Carbon Dioxide    27.9  (21.0-32.0)  mmol/L


 


BUN    14  (7.0-18.0)  mg/dL


 


Creatinine    0.8  (0.6-1.0)  mg/dL


 


Est Cr Clr Drug Dosing    66.54  mL/min


 


Estimated GFR (MDRD)    > 60.0  ml/min


 


Glucose    106  ()  mg/dL


 


Calcium    8.6  (8.5-10.1)  mg/dL


 


Total Bilirubin    0.4  (0.2-1.0)  mg/dL


 


AST    16  (15-37)  IU/L


 


ALT    26  (14-63)  IU/L


 


Alkaline Phosphatase    82  ()  U/L


 


Total Protein    7.1  (6.4-8.2)  g/dL


 


Albumin    3.5  (3.4-5.0)  g/dL


 


Globulin    3.6  (2.6-4.0)  g/dL


 


Albumin/Globulin Ratio    1.0  (0.9-1.6)  


 


Urine Color  YELLOW    


 


Urine Appearance  CLEAR    


 


Urine pH  5.5    (5.0-8.0)  


 


Ur Specific Gravity  1.025    (1.001-1.035)  


 


Urine Protein  NEGATIVE    (NEGATIVE)  mg/dL


 


Urine Glucose (UA)  NEGATIVE    (NEGATIVE)  mg/dL


 


Urine Ketones  NEGATIVE    (NEGATIVE)  mg/dL


 


Urine Occult Blood  TRACE-INTACT H    (NEGATIVE)  


 


Urine Nitrite  NEGATIVE    (NEGATIVE)  


 


Urine Bilirubin  NEGATIVE    (NEGATIVE)  


 


Urine Urobilinogen  0.2    (<2.0)  EU/dL


 


Ur Leukocyte Esterase  NEGATIVE    (NEGATIVE)  


 


Urine RBC  0-2    (0-2/HPF)  


 


Urine WBC  0-1    (0-5/HPF)  


 


Ur Epithelial Cells  FEW    (NONE-FEW)  


 


Urine Bacteria  FEW    (NEGATIVE)  


 


Urine Mucus  LIGHT    (NONE-MOD)  











Meds: 


Medications











Generic Name Dose Route Start Last Admin





  Trade Name Pritesh  PRN Reason Stop Dose Admin


 


Sodium Chloride  10 ml  20 07:47 





  Saline Flush  FLUSH  





  ASDIRECTED PRN  





  Keep Vein Open  


 


Sodium Chloride  2.5 ml  20 07:47 





  Saline Flush  FLUSH  





  ASDIRECTED PRN  





  Keep Vein Open  


 


Sodium Chloride  10 ml  20 08:07 





  Saline Flush  FLUSH  





  ASDIRECTED PRN  





  Keep Vein Open  


 


Sodium Chloride  2.5 ml  20 08:07 





  Saline Flush  FLUSH  





  ASDIRECTED PRN  





  Keep Vein Open  














Discontinued Medications














Generic Name Dose Route Start Last Admin





  Trade Name Pritesh  PRN Reason Stop Dose Admin


 


Sodium Chloride  1,000 mls @ 999 mls/hr  20 07:47  20 08:12





  Normal Saline  IV  20 08:47  999 mls/hr





  .Bolus ONE   Administration


 


Ketorolac Tromethamine  15 mg  20 08:07  20 08:13





  Toradol  IVPUSH  20 08:08  15 mg





  ONETIME ONE   Administration














- Re-Assessments/Exams


Free Text/Narrative Re-Assessment/Exam: 





20 10:17


The patient is feeling well.  She was sleeping comfortably when I went to 

reassess her, and when I woke her up, she reported that her pain was completely 

resolved.  She is in no acute distress.


Discussed lab, urine and CT scan results, and no clear etiology found for her 

low back pain radiating into her abdomen.  Discussed potential for 

musculoskeletal etiology for pain and recommendation to follow-up with primary 

care clinic for further evaluation outpatient.  Patient reports she has no local

 primary care doctor and therefore will be referred to the primary care clinics.





Departure





- Departure


Time of Disposition: 10:19


Disposition: Home, Self-Care 01


Clinical Impression: 


Low back pain


Qualifiers:


 Chronicity: acute Back pain laterality: left Sciatica presence: without sciat

ica Qualified Code(s): M54.5 - Low back pain








- Discharge Information


Instructions:  Acute Back Pain, Adult, Back Injury Prevention, Easy-to-Read, 

Back Exercises, Easy-to-Read


Referrals: 


PCP,None [Primary Care Provider] - 


Forms:  ED Department Discharge


Additional Instructions: 


You may use Tylenol or ibuprofen for pain control.  You may also apply ice 

alternating with heat to the area that hurts.  You may do some stretching of the

back to see if that helps.  Please follow-up with 1 of the primary care clinics 

listed below within the next 2 to 3 days for further evaluation.  Return to the 

emergency department if your pain becomes more severe, if you develop a fever, 

or if your pain is not well controlled by the pain medication regimen listed 

above.








The following information is given to patients seen in the emergency department 

who are being discharged to home. This information is to outline your options 

for follow-up care. We provide all patients seen in our emergency department 

with a follow-up referral.





The need for follow-up, as well as the timing and circumstances, are variable 

depending upon the specifics of your emergency department visit.





If you don't have a primary care physician on staff, we will provide you with a 

referral. We always advise you to contact your personal physician following an 

emergency department visit to inform them of the circumstance of the visit and 

for follow-up with them and/or the need for any referrals to a consulting 

specialist.





The emergency department will also refer you to a specialist when appropriate. 

This referral assures that you have the opportunity for follow-up care with a 

specialist. All of these measure are taken in an effort to provide you with 

optimal care, which includes your follow-up.





Under all circumstances we always encourage you to contact your private 

physician who remains a resource for coordinating your care. When calling for 

follow-up care, please make the office aware that this follow-up is from your 

recent emergency room visit. If for any reason you are refused follow-up, please

contact the [facility] Emergency Department

at [phone] and asked to speak to the emergency department charge nurse.





[facility] - [facility]


[address] [place] [id number]


Phone: [phone]


Fax: [phone]








[address] [address]


Phone: [phone]


Fax: [phone]














Sepsis Event Note (ED)





- Evaluation


Sepsis Screening Result: No Definite Risk





- Focused Exam


Vital Signs: 


                                   Vital Signs











  Temp Pulse Resp BP Pulse Ox


 


 20 08:37   56 L  20  116/79  97


 


 20 07:48  97.3 F  65  20  140/93 H  98














- My Orders


Last 24 Hours: 


My Active Orders





20 07:47


Sodium Chloride 0.9% [Saline Flush]   10 ml FLUSH ASDIRECTED PRN 


Sodium Chloride 0.9% [Saline Flush]   2.5 ml FLUSH ASDIRECTED PRN 


Saline Lock Insert [OM.PC] Stat 





20 08:07


Sodium Chloride 0.9% [Saline Flush]   10 ml FLUSH ASDIRECTED PRN 


Sodium Chloride 0.9% [Saline Flush]   2.5 ml FLUSH ASDIRECTED PRN 


Saline Lock Insert [OM.PC] Stat 














- Assessment/Plan


Last 24 Hours: 


My Active Orders





20 07:47


Sodium Chloride 0.9% [Saline Flush]   10 ml FLUSH ASDIRECTED PRN 


Sodium Chloride 0.9% [Saline Flush]   2.5 ml FLUSH ASDIRECTED PRN 


Saline Lock Insert [OM.PC] Stat 





20 08:07


Sodium Chloride 0.9% [Saline Flush]   10 ml FLUSH ASDIRECTED PRN 


Sodium Chloride 0.9% [Saline Flush]   2.5 ml FLUSH ASDIRECTED PRN 


Saline Lock Insert [OM.PC] Stat

## 2022-01-10 NOTE — EDM.PDOC
ED HPI GENERAL MEDICAL PROBLEM





- General


Chief Complaint: Back Pain or Injury


Stated Complaint: lower back pain


Time Seen by Provider: 01/10/22 10:41





- History of Present Illness


INITIAL COMMENTS - FREE TEXT/NARRATIVE: 





History of present illness:


[]


This woman is suffered since 2022 with pain in the left flank.  She 

also has burning when she urinates.  When she stands up she is weak and dizzy.  

She feels almost lightheaded like she might pass out.  She has no nausea and 

vomiting.  She has no fever and chills.  She has no sweating.  The pain is 

similar to what she has had with infection in the kidneys she thinks.  She has 

not had any recent antibiotics but was taking medicine for yeast infection which

 is not helping.


Review of systems: 


As per history of present illness and below otherwise all systems reviewed and 

negative.





Past medical history: 


As per history of present illness and as reviewed below otherwise 

noncontributory.





Surgical history: 


As per history of present illness and as reviewed below otherwise 

noncontributory.





Social history: 


No reported history of drug or alcohol abuse.





Family history: 


As per history of present illness and as reviewed below otherwise 

noncontributory.





Physical exam:


Constitutional - well developed, well-nourished and in no acute distress


HEENT - normocephalic, no evidence of trauma - external nose and mouth normal - 

no mass in neck and no JVD - mucosae moist





EYES - full EOM, PERRL, no icterus - no evidence of inflammation, injection, or 

drainage





Respiratory - no respiratory distress, equal bilateral expansion, lungs clear to

 auscultation and no abnormal lung sounds





Cardiovascular - Regular Rhythm with S1 and S2 appreciated and no murmur, gallop

 or rub.





GI - abdomen soft without distension or organomegaly - normal bowel sounds - no 

guard or rebound





Musculoskeletal  no gross deformity of long bones or joints - no tenderness, 

swelling or edema





Neurologic - Alert and oriented times four - CN II-XII grossly intact - motor 

sensory and coordination symmetrically normal





Psychiatric - appropriate mood and affect with normal thought content





Hematologic - No petechiae or purpura - mucosa appropriate color and sclera not 

pale - normal nail bed color and refill





Integument - no rash or evidence of trauma - normal turgor





Diagnostics:


[]





Therapeutics:


[]





Impression: 


[]





Plan:


[]





Definitive disposition and diagnosis as appropriate pending reevaluation and 

review of above.








  ** back


Pain Score (Numeric/FACES): 8





- Related Data


                                    Allergies











Allergy/AdvReac Type Severity Reaction Status Date / Time


 


No Known Allergies Allergy   Verified 01/10/22 10:42











Home Meds: 


                                    Home Meds





Cyclobenzaprine [Flexeril] 10 mg PO TID PRN #12 tab 01/10/22 [Rx]


methylPREDNISolone [Medrol Dose Pack] 4 mg PO DAILY #21 tab 01/10/22 [Rx]











Past Medical History





- Past Health History


Medical/Surgical History: Denies Medical/Surgical History


HEENT History: Reports: Other (See Below)


Other HEENT History: tympanoplasty 2016, h/o TMJ problems


Cardiovascular History: Reports: None


Respiratory History: Reports: None


Gastrointestinal History: Reports: GERD


Genitourinary History: Reports: None


OB/GYN History: Reports: Pregnancy


Musculoskeletal History: Reports: None, Other (See Below)


Neurological History: Reports: None


Psychiatric History: Reports: None


Endocrine/Metabolic History: Reports: Obesity/BMI 30+


Hematologic History: Reports: None


Immunologic History: Reports: None


Oncologic (Cancer) History: Reports: None


Dermatologic History: Reports: Psoriasis





- Infectious Disease History


Infectious Disease History: Reports: None





- Past Surgical History


Head Surgeries/Procedures: Reports: None


HEENT Surgical History: Reports: Other (See Below)


Cardiovascular Surgical History: Reports: None


Female  Surgical History: Reports:  Section, Tubal Ligation





Social & Family History





- Family History


Family Medical History: No Pertinent Family History





- Caffeine Use


Caffeine Use: Reports: Coffee





ED ROS GENERAL





- Review of Systems


Review Of Systems: Comprehensive ROS is negative, except as noted in HPI.





ED EXAM, GENERAL





- Physical Exam


Exam: See Below


Free Text/Narrative:: 





My physical exam is in the HPI





Course





- Vital Signs


Last Recorded V/S: 


                                Last Vital Signs











Temp  36.1 C   01/10/22 10:42


 


Pulse  68   01/10/22 10:42


 


Resp  18   01/10/22 10:42


 


BP  118/56 L  01/10/22 10:42


 


Pulse Ox  97   01/10/22 10:42














- Orders/Labs/Meds


Orders: 


                               Active Orders 24 hr











 Category Date Time Status


 


 CULTURE URINE [MREF] Stat Lab  01/10/22 12:30 Received


 


 Sodium Chloride 0.9% [Saline Flush] Med  01/10/22 10:58 Active





 10 ml FLUSH ASDIRECTED PRN   


 


 Sodium Chloride 0.9% [Saline Flush] Med  01/10/22 10:58 Active





 2.5 ml FLUSH ASDIRECTED PRN   


 


 Saline Lock Insert [OM.PC] Stat Oth  01/10/22 10:58 Ordered








                                Medication Orders





Sodium Chloride (Sodium Chloride 0.9% 10 Ml Syringe)  10 ml FLUSH ASDIRECTED PRN


   PRN Reason: Keep Vein Open


   Last Admin: 01/10/22 11:21  Dose: 10 ml


   Documented by: GRIS


Sodium Chloride (Sodium Chloride 0.9% 2.5 Ml Syringe)  2.5 ml FLUSH ASDIRECTED 

PRN


   PRN Reason: Keep Vein Open


   Last Admin: 01/10/22 11:21  Dose: 2.5 ml


   Documented by: GRIS








Labs: 


                                Laboratory Tests











  01/10/22 01/10/22 01/10/22 Range/Units





  11:20 11:20 12:30 


 


WBC  8.34    (4.0-11.0)  K/uL


 


RBC  4.36    (4.30-5.90)  M/uL


 


Hgb  13.8    (12.0-16.0)  g/dL


 


Hct  39.8    (36.0-46.0)  %


 


MCV  91.3    (80.0-98.0)  fL


 


MCH  31.7    (27.0-32.0)  pg


 


MCHC  34.7    (31.0-37.0)  g/dL


 


RDW Std Deviation  43.8    (28.0-62.0)  fl


 


RDW Coeff of Marleny  13    (11.0-15.0)  %


 


Plt Count  229    (150-400)  K/uL


 


MPV  11.60    (7.40-12.00)  fL


 


Neut % (Auto)  62.2    (48.0-80.0)  %


 


Lymph % (Auto)  28.3    (16.0-40.0)  %


 


Mono % (Auto)  7.3    (0.0-15.0)  %


 


Eos % (Auto)  2.0    (0.0-7.0)  %


 


Baso % (Auto)  0.2    (0.0-1.5)  %


 


Neut # (Auto)  5.2    (1.4-5.7)  K/uL


 


Lymph # (Auto)  2.4    (0.6-2.4)  K/uL


 


Mono # (Auto)  0.6    (0.0-0.8)  K/uL


 


Eos # (Auto)  0.2    (0.0-0.7)  K/uL


 


Baso # (Auto)  0.0    (0.0-0.1)  K/uL


 


Nucleated RBC %  0.0    /100WBC


 


Nucleated RBCs #  0    K/uL


 


Sodium   139   (136-145)  mmol/L


 


Potassium   3.7   (3.5-5.1)  mmol/L


 


Chloride   105   ()  mmol/L


 


Carbon Dioxide   25.5   (21.0-32.0)  mmol/L


 


BUN   9   (7.0-18.0)  mg/dL


 


Creatinine   0.8   (0.6-1.0)  mg/dL


 


Est Cr Clr Drug Dosing   71.84   mL/min


 


Estimated GFR (MDRD)   > 60.0   ml/min


 


Glucose   92   ()  mg/dL


 


Calcium   8.5   (8.5-10.1)  mg/dL


 


Total Bilirubin   0.2   (0.2-1.0)  mg/dL


 


AST   13 L   (15-37)  IU/L


 


ALT   29   (14-63)  IU/L


 


Alkaline Phosphatase   79   ()  U/L


 


Total Protein   6.5   (6.4-8.2)  g/dL


 


Albumin   3.2 L   (3.4-5.0)  g/dL


 


Globulin   3.3   (2.6-4.0)  g/dL


 


Albumin/Globulin Ratio   1.0   (0.9-1.6)  


 


Lipase   115   ()  U/L


 


Urine Color    YELLOW  


 


Urine Appearance    CLEAR  


 


Urine pH    6.0  (5.0-8.0)  


 


Ur Specific Gravity    1.010  (1.001-1.035)  


 


Urine Protein    NEGATIVE  (NEGATIVE)  mg/dL


 


Urine Glucose (UA)    NEGATIVE  (NEGATIVE)  mg/dL


 


Urine Ketones    NEGATIVE  (NEGATIVE)  mg/dL


 


Urine Occult Blood    NEGATIVE  (NEGATIVE)  


 


Urine Nitrite    NEGATIVE  (NEGATIVE)  


 


Urine Bilirubin    NEGATIVE  (NEGATIVE)  


 


Urine Urobilinogen    0.2  (<2.0)  EU/dL


 


Ur Leukocyte Esterase    NEGATIVE  (NEGATIVE)  











Meds: 


Medications











Generic Name Dose Route Start Last Admin





  Trade Name Freq  PRN Reason Stop Dose Admin


 


Sodium Chloride  10 ml  01/10/22 10:58  01/10/22 11:21





  Sodium Chloride 0.9% 10 Ml Syringe  FLUSH   10 ml





  ASDIRECTED PRN   Administration





  Keep Vein Open  


 


Sodium Chloride  2.5 ml  01/10/22 10:58  01/10/22 11:21





  Sodium Chloride 0.9% 2.5 Ml Syringe  FLUSH   2.5 ml





  ASDIRECTED PRN   Administration





  Keep Vein Open  














Discontinued Medications














Generic Name Dose Route Start Last Admin





  Trade Name Pritesh  PRN Reason Stop Dose Admin


 


Sodium Chloride  1,000 mls @ 999 mls/hr  01/10/22 10:58  01/10/22 11:21





  Normal Saline  IV  01/10/22 11:58  999 mls/hr





  .Bolus ONE   Administration














Departure





- Departure


Time of Disposition: 13:46


Disposition: Home, Self-Care 01


Condition: Good


Clinical Impression: 


 Back pain








- Discharge Information


Instructions:  Muscle Strain, Easy-to-Read, Acute Back Pain, Adult


Forms:  ED Department Discharge


Additional Instructions: 


Your prescriptions went to [facility]





Use heat to the back and exercise range of motion.  A culture was sent and if 

there is in fact infection in your urine someone will call you and order an 

antibiotic.





If you lose control of your bowel or bladder habits loss of function or feeling 

in your extremities you need to return immediately and emergently.








[facility]


[address] [place] [address]


Phone: [phone]


Fax: [phone]








[place] [address]


Phone: [phone]


Fax: [phone]








The following information is given to patients seen in the emergency department 

who are being discharged to home. This information is to outline your options 

for follow-up care. We provide all patients seen in our emergency department 

with a follow-up referral.





The need for follow-up, as well as the timing and circumstances, are variable 

depending upon the specifics of your emergency department visit.





If you don't have a primary care physician on staff, we will provide you with a 

referral. We always advise you to contact your personal physician following an 

emergency department visit to inform them of the circumstance of the visit and 

for follow-up with them and/or the need for any referrals to a consulting 

specialist.





The emergency department will also refer you to a specialist when appropriate. 

This referral assures that you have the opportunity for follow-up care with a 

specialist. All of these measure are taken in an effort to provide you with 

optimal care, which includes your follow-up.





Under all circumstances we always encourage you to contact your private 

physician who remains a resource for coordinating your care. When calling for 

follow-up care, please make the office aware that this follow-up is from your 

recent emergency room visit. If for any reason you are refused follow-up, please

 contact the [facility] Emergency 

Department at [phone] and asked to speak to the emergency department 

charge nurse.








Sepsis Event Note (ED)





- Evaluation


Sepsis Screening Result: No Definite Risk





- Focused Exam


Vital Signs: 


                                   Vital Signs











  Temp Pulse Resp BP Pulse Ox


 


 01/10/22 10:42  36.1 C  68  18  118/56 L  97














- My Orders


Last 24 Hours: 


My Active Orders





01/10/22 10:58


Sodium Chloride 0.9% [Saline Flush]   10 ml FLUSH ASDIRECTED PRN 


Sodium Chloride 0.9% [Saline Flush]   2.5 ml FLUSH ASDIRECTED PRN 


Saline Lock Insert [OM.PC] Stat 





01/10/22 12:30


CULTURE URINE [MREF] Stat 














- Assessment/Plan


Last 24 Hours: 


My Active Orders





01/10/22 10:58


Sodium Chloride 0.9% [Saline Flush]   10 ml FLUSH ASDIRECTED PRN 


Sodium Chloride 0.9% [Saline Flush]   2.5 ml FLUSH ASDIRECTED PRN 


Saline Lock Insert [OM.PC] Stat 





01/10/22 12:30


CULTURE URINE [MREF] Stat